# Patient Record
Sex: MALE | Race: WHITE
[De-identification: names, ages, dates, MRNs, and addresses within clinical notes are randomized per-mention and may not be internally consistent; named-entity substitution may affect disease eponyms.]

---

## 2019-01-30 ENCOUNTER — HOSPITAL ENCOUNTER (EMERGENCY)
Dept: HOSPITAL 76 - ED | Age: 29
Discharge: HOME | End: 2019-01-30
Payer: COMMERCIAL

## 2019-01-30 VITALS — SYSTOLIC BLOOD PRESSURE: 120 MMHG | DIASTOLIC BLOOD PRESSURE: 71 MMHG

## 2019-01-30 DIAGNOSIS — M62.838: ICD-10-CM

## 2019-01-30 DIAGNOSIS — G89.29: ICD-10-CM

## 2019-01-30 DIAGNOSIS — M54.5: Primary | ICD-10-CM

## 2019-01-30 PROCEDURE — 99283 EMERGENCY DEPT VISIT LOW MDM: CPT

## 2019-01-30 PROCEDURE — 96372 THER/PROPH/DIAG INJ SC/IM: CPT

## 2019-01-30 NOTE — ED PHYSICIAN DOCUMENTATION
PD HPI BACK PAIN





- Stated complaint


Stated Complaint: BACK PX





- Chief complaint


Chief Complaint: Back Pain





- History obtained from


History obtained from: Patient





- History of Present Illness


Timing - onset: How many hours ago (1)


Timing - details: Still present


Location: Lower


Quality: Pain, Spasm


Worsened by: Movement


Contributing factors: Other (Working out in gym.)


Similar symptoms before: Diagnosis (Similar episode about four years ago.)





- Additional information


Additional information: 


The patient is a 28-year-old [employer] male who presents with bilateral 

lower back pain/spasms that started about 1 hour prior to arrival while working 

out in the gym.  He denies fever, urinary incontinence, numbness or weakness.  

His pain is worse with movement.  He reports history of similar symptoms about 4

years ago, and has had less severe episodes intermittently since that time.








Review of Systems


Constitutional: denies: Fever


GI: denies: Abdominal Pain, Nausea, Vomiting


: denies: Dysuria, Incontinent


Skin: denies: Rash


Musculoskeletal: reports: Back pain.  denies: Extremity pain


Neurologic: denies: Focal weakness, Numbness





PD PAST MEDICAL HISTORY





- Past Medical History


Past Medical History: Yes


Cardiovascular: None


Respiratory: None


Neuro: None


Endocrine/Autoimmune: None


GI: None


: None


HEENT: None


Psych: None


Musculoskeletal: Chronic back pain, Other


Derm: None





- Past Surgical History


Past Surgical History: Yes





- Present Medications


Home Medications: 


                                Ambulatory Orders











 Medication  Instructions  Recorded  Confirmed


 


Cyclobenzaprine [Flexeril] 10 mg PO TID PRN #20 tablet 01/30/19 


 


Hydrocodone/Acetaminophen 1 - 2 each PO Q6H PRN #14 tablet 01/30/19 





[Hydrocodon-Acetaminophen 5-325]   


 


Ibuprofen [Motrin] 800 mg PO Q8H PRN #30 tablet 01/30/19 


 


Sertraline [Zoloft] 50 mg PO DAILY 01/30/19 01/30/19


 


buPROPion [Wellbutrin Xl] 150 mg PO DAILY 01/30/19 01/30/19


 


predniSONE [Prednisone] 40 mg PO DAILY #10 tablet 01/30/19 














- Allergies


Allergies/Adverse Reactions: 


                                    Allergies











Allergy/AdvReac Type Severity Reaction Status Date / Time


 


No Known Drug Allergies Allergy   Verified 01/30/19 15:53














- Social History


Does the pt smoke?: No


Smoking Status: Never smoker


Does the pt drink ETOH?: Yes


ETOH Use: Beer


Does the pt have substance abuse?: No





- Immunizations


Immunizations are current?: Yes





- POLST


Patient has POLST: No





PD ED PE NORMAL





- Vitals


Vital signs reviewed: Yes (Normal)





- General


General: Alert and oriented X 3, Well developed/nourished, Other (Large 

stature.)





- HEENT


HEENT: Atraumatic





- Respiratory


Respiratory: No respiratory distress





- Back


Back: No spinal TTP, Other (Tenderness to palpation in the paralumbar 

musculature bilaterally, with spasms.)





- Derm


Derm: No rash





- Extremities


Extremities: No edema, No calf tenderness / cord, Other (Straight leg raise test

 is negative bilaterally.)





- Neuro


Neuro: Alert and oriented X 3, No motor deficit, No sensory deficit, Other (Deep

 tendon reflexes are 2+ and equal bilaterally at the patellar and Achilles 

tendons.)





Results





- Vitals


Vitals: 


                               Vital Signs - 24 hr











  01/30/19 01/30/19





  15:50 19:01


 


Temperature 36.3 C L 36.3 C L


 


Heart Rate 104 H 79


 


Respiratory 20 16





Rate  


 


Blood Pressure 119/74 120/71


 


O2 Saturation 97 96








                                     Oxygen











O2 Source                      Room air

















PD MEDICAL DECISION MAKING





- ED course


Complexity details: re-evaluated patient, considered differential, d/w patient, 

d/w family


ED course: 





The patient's presentation is significant for acute lumbar strain with spasms.  

His presentation does not suggest epidural abscess, spinal stenosis, or cauda 

equina syndrome.  Treatment in the emergency department included administration 

of ketorolac 60 mg IM, dexamethasone 10 mg orally, and diazepam 5 mg orally.  He

 continued to have persistent pain, for which hydromorphone 1 mg is administered

 IM.  His symptoms slightly improved with the above treatment.  He is being 

discharged with prescriptions for ibuprofen, Flexeril, prednidone, and Vicodin, 

14 tablets.  I discussed with him and his wife the expected course of injury, 

symptomatic treatment and outpatient follow-up, as well as potentially worrisome

 signs or symptoms that should prompt reevaluation in the emergency department.





Departure





- Departure


Disposition: 01 Home, Self Care


Clinical Impression: 


Back pain


Qualifiers:


 Back pain location: low back pain Chronicity: acute Back pain laterality: 

bilateral Sciatica presence: without sciatica Qualified Code(s): M54.5 - Low 

back pain





Condition: Stable


Instructions:  ED Low Back Pain Injury


Follow-Up: 


LETY HADDAD [Primary Care Provider] - 


Prescriptions: 


Cyclobenzaprine [Flexeril] 10 mg PO TID PRN #20 tablet


 PRN Reason: Spasms


Hydrocodone/Acetaminophen [Hydrocodon-Acetaminophen 5-325] 1 - 2 each PO Q6H PRN

#14 tablet


 PRN Reason: pain


Ibuprofen [Motrin] 800 mg PO Q8H PRN #30 tablet


 PRN Reason: PAIN &/OR FEVER


predniSONE [Prednisone] 40 mg PO DAILY #10 tablet


Comments: 


Apply ice pack to your lower back intermittently for the next 3 or 4 days.


You can use ibuprofen, up to 800 mg 3 times daily for its anti-inflammatory 

effect.


You can use Flexeril as prescribed if needed for muscle spasms.


Take prednisone daily as prescribed.


Let pain be your guide to activity level.


Return to the emergency department if you develop increasing pain, numbness or 

weakness, or otherwise worsening symptoms.


Forms:  Activity restrictions


Discharge Date/Time: 01/30/19 19:16

## 2019-02-03 ENCOUNTER — HOSPITAL ENCOUNTER (EMERGENCY)
Dept: HOSPITAL 76 - ED | Age: 29
Discharge: HOME | End: 2019-02-03
Payer: COMMERCIAL

## 2019-02-03 VITALS — SYSTOLIC BLOOD PRESSURE: 121 MMHG | DIASTOLIC BLOOD PRESSURE: 77 MMHG

## 2019-02-03 DIAGNOSIS — M54.5: Primary | ICD-10-CM

## 2019-02-03 DIAGNOSIS — M62.830: ICD-10-CM

## 2019-02-03 LAB
CLARITY UR REFRACT.AUTO: CLEAR
GLUCOSE UR QL STRIP.AUTO: NEGATIVE MG/DL
KETONES UR QL STRIP.AUTO: NEGATIVE MG/DL
NITRITE UR QL STRIP.AUTO: NEGATIVE
PH UR STRIP.AUTO: 5.5 PH (ref 5–7.5)
PROT UR STRIP.AUTO-MCNC: NEGATIVE MG/DL
RBC # UR STRIP.AUTO: NEGATIVE /UL
SP GR UR STRIP.AUTO: 1.02 (ref 1–1.03)
UROBILINOGEN UR QL STRIP.AUTO: (no result) E.U./DL
UROBILINOGEN UR STRIP.AUTO-MCNC: NEGATIVE MG/DL

## 2019-02-03 PROCEDURE — 72100 X-RAY EXAM L-S SPINE 2/3 VWS: CPT

## 2019-02-03 PROCEDURE — 81003 URINALYSIS AUTO W/O SCOPE: CPT

## 2019-02-03 PROCEDURE — 81001 URINALYSIS AUTO W/SCOPE: CPT

## 2019-02-03 PROCEDURE — 99283 EMERGENCY DEPT VISIT LOW MDM: CPT

## 2019-02-03 PROCEDURE — 87086 URINE CULTURE/COLONY COUNT: CPT

## 2019-02-03 NOTE — ED PHYSICIAN DOCUMENTATION
PD HPI BACK PAIN





- Stated complaint


Stated Complaint: BACK PX





- Chief complaint


Chief Complaint: Back Pain





- History obtained from


History obtained from: Patient





- History of Present Illness


Timing - onset: How many days ago (5)


Timing - details: Abrupt onset


Severity Comments: moderate


Location: Lower


Quality: Pain, Spasm.  No: Tearing, Aching, Throbbing


Associated symptoms: No: Fever, Weakness, Numbness, Incontinent of urine, Unable

to urinate, Hematuria, Incontinent of stool


Improves with: Rest, Position.  No: Ice


Worsened by: Movement, Lifting, Twisting, Palpation


Contributing factors: Lifting, Twisting.  No: Anticoagulated, Cancer, IVDA, Out 

of meds


Similar symptoms before: No diagnosis


Recently seen: Emergency Dept





Review of Systems


Constitutional: denies: Fever, Chills


Eyes: denies: Discharge


Ears: denies: Ear pain


Nose: denies: Congestion


Throat: denies: Sore throat


Cardiac: denies: Chest pain / pressure


Respiratory: denies: Cough


GI: denies: Abdominal Pain


Skin: denies: Rash


Musculoskeletal: reports: Back pain


Neurologic: denies: Generalized weakness


Immunocompromised: denies: Chemotherapy





PD PAST MEDICAL HISTORY





- Past Medical History


Past Medical History: Yes


Cardiovascular: None


Respiratory: None


Neuro: None


Endocrine/Autoimmune: None


GI: None


: None


HEENT: None


Psych: None


Musculoskeletal: Chronic back pain, Other


Derm: None





- Past Surgical History


Past Surgical History: Yes





- Present Medications


Home Medications: 


                                Ambulatory Orders











 Medication  Instructions  Recorded  Confirmed


 


Cyclobenzaprine [Flexeril] 10 mg PO TID PRN #20 tablet 01/30/19 


 


Hydrocodone/Acetaminophen 1 - 2 each PO Q6H PRN #14 tablet 01/30/19 





[Hydrocodon-Acetaminophen 5-325]   


 


Ibuprofen [Motrin] 800 mg PO Q8H PRN #30 tablet 01/30/19 


 


Sertraline [Zoloft] 50 mg PO DAILY 01/30/19 01/30/19


 


buPROPion [Wellbutrin Xl] 150 mg PO DAILY 01/30/19 01/30/19


 


predniSONE [Prednisone] 40 mg PO DAILY #10 tablet 01/30/19 














- Allergies


Allergies/Adverse Reactions: 


                                    Allergies











Allergy/AdvReac Type Severity Reaction Status Date / Time


 


No Known Drug Allergies Allergy   Verified 01/30/19 15:53














- Social History


Does the pt smoke?: No


Smoking Status: Never smoker


Does the pt drink ETOH?: Yes


Does the pt have substance abuse?: No





- Immunizations


Immunizations are current?: Yes





- POLST


Patient has POLST: No





PD ED PE NORMAL





- General


General: Alert and oriented X 3, No acute distress





- HEENT


HEENT: Atraumatic, PERRL, EOMI, Ears normal





- Neck


Neck: Supple, no meningeal sign





- Cardiac


Cardiac: RRR, Strong equal pulses





- Respiratory


Respiratory: No respiratory distress





- Abdomen


Abdomen: Soft, Non tender





- Back


Back: No CVA TTP.  No: No spinal TTP (The patient has tenderness to palpation in

 the left lower paraspinal region around L5-S1.  There is no crepitus, no 

bogginess, no skin changes or swelling)





- Derm


Derm: Normal color





- Extremities


Extremities: No deformity





- Neuro


Neuro: Alert and oriented X 3, No motor deficit, No sensory deficit, Normal 

speech, Other (2/4 Patellar reflex)





- Psych


Psych: Normal mood





Results





- Vitals


Vitals: 


                               Vital Signs - 24 hr











  02/03/19





  21:05


 


Temperature 36.8 C


 


Heart Rate 101 H


 


Respiratory 15





Rate 


 


Blood Pressure 150/99 H


 


O2 Saturation 97








                                     Oxygen











O2 Source                      Room air

















- Labs


Labs: 


                                Laboratory Tests











  02/03/19





  21:32


 


Urine Color  STRAW


 


Urine Clarity  CLEAR


 


Urine pH  5.5


 


Ur Specific Gravity  1.025


 


Urine Protein  NEGATIVE


 


Urine Glucose (UA)  NEGATIVE


 


Urine Ketones  NEGATIVE


 


Urine Occult Blood  NEGATIVE


 


Urine Nitrite  NEGATIVE


 


Urine Bilirubin  NEGATIVE


 


Urine Urobilinogen  1 (NORMAL)


 


Ur Leukocyte Esterase  NEGATIVE


 


Ur Microscopic Review  NOT INDICATED


 


Urine Culture Comments  NOT INDICATED














- Rads (name of study)


  ** XR lumbar


Radiology: Final report received, See rad report





PD MEDICAL DECISION MAKING





- ED course


ED course: 





No findings to suggest acute cauda equina or epidural abscess or kidney stone 

and currently the patient does not appear to require emergent MRI or further 

workup in the emergency department.  The patient appears appropriate for ongoing

 outpatient management.  I discussed warning signs and recommended returning for

 any worsening or any concerns.





Departure





- Departure


Clinical Impression: 


Back pain


Qualifiers:


 Back pain location: low back pain Chronicity: acute Back pain laterality: 

unspecified Sciatica presence: unspecified whether sciatica present Qualified 

Code(s): M54.5 - Low back pain





Condition: Good


Instructions:  Lumbar Flexion, Lumbar Stretch, ED Low Back Pain Injury, ED 

Sprain Strain Lumbar


Follow-Up: 


LETY HADDAD [Primary Care Provider] - Within 3 Days (Please ask your 

primary care to arrange for outpatient physical therapy. If your symptoms are 

not improving you may require an outpatient MRI)


Comments: 


Please return to the emergency department for worsening symptoms or any concerns

## 2019-02-03 NOTE — XRAY REPORT
Reason:  back pain

Procedure Date:  02/03/2019   

Accession Number:  [id number] / [id number]                    

Procedure:  XR  - Lumbar Spine 2 View CPT Code:  

 

FULL RESULT:

 

 

EXAM: LUMBOSACRAL SPINE RADIOGRAPHY.

 

EXAM DATE: 02/03/2019 09:47 PM.

 

CLINICAL HISTORY: Back pain. Right-sided posterior lumbar spine pain for 

5 days.

 

COMPARISONS: None.

 

TECHNIQUE: 3 views.

 

FINDINGS:

Alignment: Normal. No spondylolisthesis or scoliosis.

 

Bones: Five non-rib-bearing lumbar vertebral bodies are present. No 

fractures or bone lesions.

 

Disks: Normal. Disk heights are maintained.

 

Facets: No degenerative changes.

 

Sacroiliac Joints: Unremarkable.

 

Soft Tissues: Normal. The visualized bowel gas pattern is normal.

IMPRESSION: Normal lumbar spine radiography.

 

[facility]